# Patient Record
(demographics unavailable — no encounter records)

---

## 2025-06-03 NOTE — CONSULT LETTER
[FreeTextEntry1] : Dear Dr. TINO ALMEIDA,      I had the pleasure of seeing ALANA LEOS for follow up today.  Below is my note regarding the office visit today.      Thank you so very much for allowing me to participate in ALANA's care.  Please don't hesitate to call me should any questions or issues arise.         Sincerely,     Rickey Kaminski MD, FACS, FSPU    Chief, Pediatric Urology     Professor of Urology and Pediatrics     [employer] [employer]         President, [employer] - [place] Section    Past-President, Societies [employer] Pediatric [employer]

## 2025-06-03 NOTE — ASSESSMENT
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  Family continued to defer VCUG.  At this time, no further imaging studies is needed unless a UTI or other pertinent clinical situation were to develop. I recommended yearly UA and blood pressure check through adolescence in the primary care setting. This information was communicated to the family and all questions were answered. He will return here as needed.

## 2025-06-03 NOTE — CONSULT LETTER
[FreeTextEntry1] : Dear Dr. TINO ALMEIDA,      I had the pleasure of seeing ALANA LEOS for follow up today.  Below is my note regarding the office visit today.      Thank you so very much for allowing me to participate in ALANA's care.  Please don't hesitate to call me should any questions or issues arise.         Sincerely,     Rickey Kaminski MD, FACS, FSPU    Chief, Pediatric Urology     Professor of Urology and Pediatrics     [employer] [employer]         President, [employer] Association - [place] Section    Past-President, Societies [employer] Pediatric [employer]

## 2025-06-03 NOTE — DATA REVIEWED
[FreeTextEntry1] : EXAMINATION: US RENAL AND PELVIS TODAY IN OFFICE     FINDINGS: LOW LYING RIGHT KIDNEY; OTHERWISE UNREMARKABLE KIDNEY AND PELVIC STRUCTURES.

## 2025-06-03 NOTE — HISTORY OF PRESENT ILLNESS
[TextBox_4] : ALANA is here for follow up. 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 A renal ultrasound at birth demonstrated 1. Duplex morphology of both kidneys. 2. 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 Images reviewed. Initial in-office ultrasounds (Dec 2022) were unremarkable, with a possible duplication noted. 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 VCUG deferred by family.   Returns today for repeat ultrasounds. Since the last visit, he has been well without any UTIs, unexplained fevers, voiding complaints, issues feeding.

## 2025-06-03 NOTE — CONSULT LETTER
[FreeTextEntry1] : Dear Dr. TINO ALMEIDA,      I had the pleasure of seeing ALANA LEOS for follow up today.  Below is my note regarding the office visit today.      Thank you so very much for allowing me to participate in ALANA's care.  Please don't hesitate to call me should any questions or issues arise.         Sincerely,     Rickey Kaminski MD, FACS, FSPU    Chief, Pediatric Urology     Professor of Urology and Pediatrics     [facility] [employer] [facility] [employer]         President, [employer] - [place] Section    Past-President, Soci[employer] Pediatric [employer]